# Patient Record
Sex: MALE | Race: WHITE | URBAN - NONMETROPOLITAN AREA
[De-identification: names, ages, dates, MRNs, and addresses within clinical notes are randomized per-mention and may not be internally consistent; named-entity substitution may affect disease eponyms.]

---

## 2017-02-14 ENCOUNTER — APPOINTMENT (RX ONLY)
Dept: URBAN - NONMETROPOLITAN AREA CLINIC 30 | Facility: CLINIC | Age: 72
Setting detail: DERMATOLOGY
End: 2017-02-14

## 2017-02-14 VITALS
HEIGHT: 72 IN | HEART RATE: 81 BPM | SYSTOLIC BLOOD PRESSURE: 135 MMHG | WEIGHT: 260 LBS | DIASTOLIC BLOOD PRESSURE: 96 MMHG

## 2017-02-14 DIAGNOSIS — L40.0 PSORIASIS VULGARIS: ICD-10-CM | Status: WELL CONTROLLED

## 2017-02-14 PROCEDURE — 99213 OFFICE O/P EST LOW 20 MIN: CPT

## 2017-02-14 PROCEDURE — ? COUNSELING

## 2017-02-14 PROCEDURE — ? TREATMENT REGIMEN

## 2017-02-14 ASSESSMENT — LOCATION DETAILED DESCRIPTION DERM
LOCATION DETAILED: RIGHT THENAR EMINENCE
LOCATION DETAILED: LEFT ULNAR PALM

## 2017-02-14 ASSESSMENT — LOCATION ZONE DERM: LOCATION ZONE: HAND

## 2017-02-14 ASSESSMENT — LOCATION SIMPLE DESCRIPTION DERM
LOCATION SIMPLE: RIGHT HAND
LOCATION SIMPLE: LEFT HAND

## 2017-02-14 ASSESSMENT — BSA PSORIASIS: % BODY COVERED IN PSORIASIS: 1

## 2017-02-14 NOTE — HPI: MEDICATION (OTEZLA)
How Severe Is It?: moderate
Is This A New Presentation, Or A Follow-Up?: Follow Up Otezla
What Dose Of Otezla Are You Taking?: 30mg twice a day
How Many Months Of Otezla Have You Completed?: 8

## 2017-02-14 NOTE — PROCEDURE: TREATMENT REGIMEN
Action 3: Continue
Other Instructions: Will intramail July concerning patient receiving a hand light box. When he receives the light box he will do 2-3 treatments weekly.
Sig For Treatment 2 (If Needed): twice weekly
Continue Regimen: Dovonex apply daily M-F
Detail Level: Simple
Treatment 2: Clobetasol cream
Sig For Treatment 1 (If Needed): twice daily
Treatment 1: Otezla 30mg

## 2017-08-15 ENCOUNTER — APPOINTMENT (RX ONLY)
Dept: URBAN - NONMETROPOLITAN AREA CLINIC 30 | Facility: CLINIC | Age: 72
Setting detail: DERMATOLOGY
End: 2017-08-15

## 2017-08-15 VITALS
HEART RATE: 93 BPM | SYSTOLIC BLOOD PRESSURE: 147 MMHG | HEIGHT: 72 IN | RESPIRATION RATE: 20 BRPM | WEIGHT: 250 LBS | DIASTOLIC BLOOD PRESSURE: 93 MMHG

## 2017-08-15 DIAGNOSIS — L40.0 PSORIASIS VULGARIS: ICD-10-CM | Status: INADEQUATELY CONTROLLED

## 2017-08-15 DIAGNOSIS — Z79.899 OTHER LONG TERM (CURRENT) DRUG THERAPY: ICD-10-CM

## 2017-08-15 PROCEDURE — ? TREATMENT REGIMEN

## 2017-08-15 PROCEDURE — 99214 OFFICE O/P EST MOD 30 MIN: CPT | Mod: 25

## 2017-08-15 PROCEDURE — ? HUMIRA INITIATION

## 2017-08-15 PROCEDURE — ? VENIPUNCTURE

## 2017-08-15 PROCEDURE — ? COUNSELING

## 2017-08-15 PROCEDURE — 36415 COLL VENOUS BLD VENIPUNCTURE: CPT

## 2017-08-15 PROCEDURE — ? DIAGNOSIS COMMENT

## 2017-08-15 PROCEDURE — ? ORDER TESTS

## 2017-08-15 ASSESSMENT — LOCATION SIMPLE DESCRIPTION DERM
LOCATION SIMPLE: LEFT HAND
LOCATION SIMPLE: RIGHT HAND

## 2017-08-15 ASSESSMENT — LOCATION DETAILED DESCRIPTION DERM
LOCATION DETAILED: RIGHT RADIAL PALM
LOCATION DETAILED: LEFT ULNAR PALM

## 2017-08-15 ASSESSMENT — BSA PSORIASIS: % BODY COVERED IN PSORIASIS: 3

## 2017-08-15 ASSESSMENT — LOCATION ZONE DERM: LOCATION ZONE: HAND

## 2017-08-15 NOTE — PROCEDURE: TREATMENT REGIMEN
Detail Level: Simple
Action 2: Continue
Treatment 1: Otezla 30mg
Treatment 2: Clobetasol cream
Sig For Treatment 2 (If Needed): twice weekly
Sig For Treatment 1 (If Needed): twice daily

## 2017-08-15 NOTE — HPI: MEDICATION (OTEZLA)
How Severe Is It?: mild
Is This A New Presentation, Or A Follow-Up?: Follow Up Otezla
Additional History: Last blood work 6/6/16, still flaring

## 2017-08-15 NOTE — PROCEDURE: ORDER TESTS
Performing Laboratory: [id number]
Bill For Surgical Tray: no
Expected Date Of Service: 08/15/2017
Billing Type: Client Bill

## 2017-08-15 NOTE — PROCEDURE: DIAGNOSIS COMMENT
Comment: TB Spot sent to [facility]\\n\\nI discussed several options with this pt including MTX, soritane, humira/enbrel, etc.  Pt drinks ETOH daily and stated he will not stop drinking so I would prefer to avoid MTX and soritane.  We discussed humira today and pt wanted to try it, consents were signed and labs were drawn, will leave him on otzela for now and topicals as needed.  He denied being at risk for HIV, so I did not draw that lab today.
Detail Level: Simple

## 2017-08-15 NOTE — PROCEDURE: VENIPUNCTURE
Number Of Tubes Drawn: 4
Venipuncture Paragraph: An alcohol pad was applied to the venipuncture site. Venipuncture was performed using a 22g needle. Pressure and a bandaid was applied to the site. No complications were noted.
Detail Level: None